# Patient Record
Sex: MALE | Race: WHITE | NOT HISPANIC OR LATINO
[De-identification: names, ages, dates, MRNs, and addresses within clinical notes are randomized per-mention and may not be internally consistent; named-entity substitution may affect disease eponyms.]

---

## 2017-08-05 ENCOUNTER — TRANSCRIPTION ENCOUNTER (OUTPATIENT)
Age: 47
End: 2017-08-05

## 2018-06-05 ENCOUNTER — EMERGENCY (EMERGENCY)
Facility: HOSPITAL | Age: 48
LOS: 1 days | Discharge: HOME | End: 2018-06-05
Admitting: EMERGENCY MEDICINE
Payer: COMMERCIAL

## 2018-06-05 VITALS
RESPIRATION RATE: 18 BRPM | DIASTOLIC BLOOD PRESSURE: 88 MMHG | TEMPERATURE: 98 F | SYSTOLIC BLOOD PRESSURE: 149 MMHG | OXYGEN SATURATION: 96 % | HEART RATE: 88 BPM

## 2018-06-05 DIAGNOSIS — R69 ILLNESS, UNSPECIFIED: ICD-10-CM

## 2018-06-05 DIAGNOSIS — F31.4 BIPOLAR DISORDER, CURRENT EPISODE DEPRESSED, SEVERE, WITHOUT PSYCHOTIC FEATURES: ICD-10-CM

## 2018-06-05 LAB
ALBUMIN SERPL ELPH-MCNC: 4.4 G/DL — SIGNIFICANT CHANGE UP (ref 3.3–5)
ALP SERPL-CCNC: 60 U/L — SIGNIFICANT CHANGE UP (ref 40–120)
ALT FLD-CCNC: 18 U/L — SIGNIFICANT CHANGE UP (ref 4–41)
AMPHET UR-MCNC: NEGATIVE — SIGNIFICANT CHANGE UP
APAP SERPL-MCNC: < 15 UG/ML — LOW (ref 15–25)
APPEARANCE UR: CLEAR — SIGNIFICANT CHANGE UP
AST SERPL-CCNC: 14 U/L — SIGNIFICANT CHANGE UP (ref 4–40)
BACTERIA # UR AUTO: SIGNIFICANT CHANGE UP
BARBITURATES UR SCN-MCNC: NEGATIVE — SIGNIFICANT CHANGE UP
BASOPHILS # BLD AUTO: 0.05 K/UL — SIGNIFICANT CHANGE UP (ref 0–0.2)
BASOPHILS NFR BLD AUTO: 0.6 % — SIGNIFICANT CHANGE UP (ref 0–2)
BENZODIAZ UR-MCNC: NEGATIVE — SIGNIFICANT CHANGE UP
BILIRUB SERPL-MCNC: 0.5 MG/DL — SIGNIFICANT CHANGE UP (ref 0.2–1.2)
BILIRUB UR-MCNC: NEGATIVE — SIGNIFICANT CHANGE UP
BLOOD UR QL VISUAL: NEGATIVE — SIGNIFICANT CHANGE UP
BUN SERPL-MCNC: 17 MG/DL — SIGNIFICANT CHANGE UP (ref 7–23)
CALCIUM SERPL-MCNC: 9.4 MG/DL — SIGNIFICANT CHANGE UP (ref 8.4–10.5)
CANNABINOIDS UR-MCNC: NEGATIVE — SIGNIFICANT CHANGE UP
CHLORIDE SERPL-SCNC: 102 MMOL/L — SIGNIFICANT CHANGE UP (ref 98–107)
CO2 SERPL-SCNC: 25 MMOL/L — SIGNIFICANT CHANGE UP (ref 22–31)
COCAINE METAB.OTHER UR-MCNC: NEGATIVE — SIGNIFICANT CHANGE UP
COLOR SPEC: YELLOW — SIGNIFICANT CHANGE UP
CREAT SERPL-MCNC: 1 MG/DL — SIGNIFICANT CHANGE UP (ref 0.5–1.3)
EOSINOPHIL # BLD AUTO: 0.06 K/UL — SIGNIFICANT CHANGE UP (ref 0–0.5)
EOSINOPHIL NFR BLD AUTO: 0.7 % — SIGNIFICANT CHANGE UP (ref 0–6)
ETHANOL BLD-MCNC: < 10 MG/DL — SIGNIFICANT CHANGE UP
GLUCOSE SERPL-MCNC: 100 MG/DL — HIGH (ref 70–99)
GLUCOSE UR-MCNC: NEGATIVE — SIGNIFICANT CHANGE UP
HCT VFR BLD CALC: 49.5 % — SIGNIFICANT CHANGE UP (ref 39–50)
HGB BLD-MCNC: 16.4 G/DL — SIGNIFICANT CHANGE UP (ref 13–17)
IMM GRANULOCYTES # BLD AUTO: 0.03 # — SIGNIFICANT CHANGE UP
IMM GRANULOCYTES NFR BLD AUTO: 0.3 % — SIGNIFICANT CHANGE UP (ref 0–1.5)
KETONES UR-MCNC: NEGATIVE — SIGNIFICANT CHANGE UP
LEUKOCYTE ESTERASE UR-ACNC: NEGATIVE — SIGNIFICANT CHANGE UP
LYMPHOCYTES # BLD AUTO: 1.96 K/UL — SIGNIFICANT CHANGE UP (ref 1–3.3)
LYMPHOCYTES # BLD AUTO: 22.6 % — SIGNIFICANT CHANGE UP (ref 13–44)
MCHC RBC-ENTMCNC: 29.3 PG — SIGNIFICANT CHANGE UP (ref 27–34)
MCHC RBC-ENTMCNC: 33.1 % — SIGNIFICANT CHANGE UP (ref 32–36)
MCV RBC AUTO: 88.6 FL — SIGNIFICANT CHANGE UP (ref 80–100)
METHADONE UR-MCNC: NEGATIVE — SIGNIFICANT CHANGE UP
MONOCYTES # BLD AUTO: 0.82 K/UL — SIGNIFICANT CHANGE UP (ref 0–0.9)
MONOCYTES NFR BLD AUTO: 9.4 % — SIGNIFICANT CHANGE UP (ref 2–14)
MUCOUS THREADS # UR AUTO: SIGNIFICANT CHANGE UP
NEUTROPHILS # BLD AUTO: 5.77 K/UL — SIGNIFICANT CHANGE UP (ref 1.8–7.4)
NEUTROPHILS NFR BLD AUTO: 66.4 % — SIGNIFICANT CHANGE UP (ref 43–77)
NITRITE UR-MCNC: NEGATIVE — SIGNIFICANT CHANGE UP
NRBC # FLD: 0 — SIGNIFICANT CHANGE UP
OPIATES UR-MCNC: NEGATIVE — SIGNIFICANT CHANGE UP
OXYCODONE UR-MCNC: NEGATIVE — SIGNIFICANT CHANGE UP
PCP UR-MCNC: NEGATIVE — SIGNIFICANT CHANGE UP
PH UR: 6 — SIGNIFICANT CHANGE UP (ref 4.6–8)
PLATELET # BLD AUTO: 298 K/UL — SIGNIFICANT CHANGE UP (ref 150–400)
PMV BLD: 10.3 FL — SIGNIFICANT CHANGE UP (ref 7–13)
POTASSIUM SERPL-MCNC: 4.1 MMOL/L — SIGNIFICANT CHANGE UP (ref 3.5–5.3)
POTASSIUM SERPL-SCNC: 4.1 MMOL/L — SIGNIFICANT CHANGE UP (ref 3.5–5.3)
PROT SERPL-MCNC: 7.3 G/DL — SIGNIFICANT CHANGE UP (ref 6–8.3)
PROT UR-MCNC: 20 MG/DL — SIGNIFICANT CHANGE UP
RBC # BLD: 5.59 M/UL — SIGNIFICANT CHANGE UP (ref 4.2–5.8)
RBC # FLD: 13.3 % — SIGNIFICANT CHANGE UP (ref 10.3–14.5)
RBC CASTS # UR COMP ASSIST: SIGNIFICANT CHANGE UP (ref 0–?)
SALICYLATES SERPL-MCNC: < 5 MG/DL — LOW (ref 15–30)
SODIUM SERPL-SCNC: 140 MMOL/L — SIGNIFICANT CHANGE UP (ref 135–145)
SP GR SPEC: 1.02 — SIGNIFICANT CHANGE UP (ref 1–1.04)
SQUAMOUS # UR AUTO: SIGNIFICANT CHANGE UP
TSH SERPL-MCNC: 1.63 UIU/ML — SIGNIFICANT CHANGE UP (ref 0.27–4.2)
UROBILINOGEN FLD QL: NORMAL MG/DL — SIGNIFICANT CHANGE UP
WBC # BLD: 8.69 K/UL — SIGNIFICANT CHANGE UP (ref 3.8–10.5)
WBC # FLD AUTO: 8.69 K/UL — SIGNIFICANT CHANGE UP (ref 3.8–10.5)
WBC UR QL: SIGNIFICANT CHANGE UP (ref 0–?)

## 2018-06-05 PROCEDURE — 99285 EMERGENCY DEPT VISIT HI MDM: CPT | Mod: 25

## 2018-06-05 PROCEDURE — 90792 PSYCH DIAG EVAL W/MED SRVCS: CPT

## 2018-06-05 PROCEDURE — 93010 ELECTROCARDIOGRAM REPORT: CPT | Mod: 59

## 2018-06-05 NOTE — ED PROVIDER NOTE - OBJECTIVE STATEMENT
This is a 48 year old Male This is a 48 year old Male PMHX Came in today for stephen Mejia reports he is here for ECT therayp sent in by his psycharist . Spoke with sister who reportx 2 past Manic attempts one hospitalization cureenlty seeing AAMIR Felder 6 weeks ago started on Buspar Lamictal and Ability caden going thru a divorce, lost children livign at home with parents not functioning. This is a 48 year old Male PMHX Came in today for psych eval Patient reports he is here for ECT therapy sent in by his psychiatrist . Spoke with sister who reports 2 past Manic attempts one hospitalization currently seeing Dr. Praveen Felder 6 weeks ago started on Buspar Lamictal and Ability currently going thru a divorce, lost children living at home with parents not functioning. Denies SI/HI Denies AH/VH Denies ETOH/Illicit drugs
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
## 2018-06-05 NOTE — ED ADULT NURSE NOTE - OBJECTIVE STATEMENT
pt A&Ox3 sent in by MD for further evaluation and possible ECT treatment, pt denies si/hi cp, pt states he does not want to be here and feels fine, he is looking for outpatient services, safety maintained will monitor,

## 2018-06-05 NOTE — ED PROVIDER NOTE - MEDICAL DECISION MAKING DETAILS
This is a 48 year old Male PMHX Came in today for psych eval Patient reports he is here for ECT therapy sent in by his psychiatrist . Spoke with sister who reports 2 past Manic attempts one hospitalization currently seeing Dr. Praveen Felder 6 weeks ago started on Buspar Lamictal and Ability currently going thru a divorce, lost children living at home with parents not functioning. Medical evaluation performed. There is no clinical evidence of intoxication or any acute medical problem requiring immediate intervention. Final disposition will be determined by psychiatrist.
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
## 2018-06-05 NOTE — ED PROVIDER NOTE - CARE PLAN
Principal Discharge DX:	Bipolar affective disorder, depressed, severe  Secondary Diagnosis:	Deferred condition on axis II

## 2018-06-05 NOTE — ED BEHAVIORAL HEALTH NOTE - BEHAVIORAL HEALTH NOTE
spoke with the patient regarding the [facility] program, which he agreed to attend.  sent an email to [facility] staff requesting an intake appointment for that program. He can be called at [phone], where messages may be left. His sister, Shagufta Hollins, [phone], should also be called, since the patient's depression may prevent him from accessing messages or recalling the appointment details.

## 2018-06-05 NOTE — ED BEHAVIORAL HEALTH ASSESSMENT NOTE - DESCRIPTION
Pt is calm and cooperative. No medication given.     Vital Signs Last 24 Hrs  T(C): 36.8 (05 Jun 2018 14:06), Max: 36.8 (05 Jun 2018 14:06)  T(F): 98.3 (05 Jun 2018 14:06), Max: 98.3 (05 Jun 2018 14:06)  HR: 88 (05 Jun 2018 14:06) (88 - 88)  BP: 149/88 (05 Jun 2018 14:06) (149/88 - 149/88)  BP(mean): --  RR: 18 (05 Jun 2018 14:06) (18 - 18)  SpO2: 96% (05 Jun 2018 14:06) (96% - 96%) denies , domiciled with parents, and unemployed.

## 2018-06-05 NOTE — ED BEHAVIORAL HEALTH ASSESSMENT NOTE - RISK ASSESSMENT
Although currently depressed, Pt denies suicidal or homicidal ideations, intent or plan. Pt does have history of suicidal ideation in the past. However, he is not an imminent danger to self or others and does not meet the criteria for involuntary admission and refuses voluntary admission.

## 2018-06-05 NOTE — ED ADULT TRIAGE NOTE - CHIEF COMPLAINT QUOTE
hx of depression currently on Lamictal, Buspar, Abilify all of which was started 6 weeks ago. Patient denies SI or HI however has loss of interest in daily activities. Calm and cooperative on triage.

## 2018-06-06 ENCOUNTER — INPATIENT (INPATIENT)
Facility: HOSPITAL | Age: 48
LOS: 21 days | Discharge: HOME | End: 2018-06-28
Attending: PSYCHIATRY & NEUROLOGY | Admitting: PSYCHIATRY & NEUROLOGY
Payer: COMMERCIAL

## 2018-06-06 VITALS
TEMPERATURE: 99 F | DIASTOLIC BLOOD PRESSURE: 103 MMHG | OXYGEN SATURATION: 99 % | SYSTOLIC BLOOD PRESSURE: 141 MMHG | HEART RATE: 93 BPM | RESPIRATION RATE: 16 BRPM

## 2018-06-06 DIAGNOSIS — F33.9 MAJOR DEPRESSIVE DISORDER, RECURRENT, UNSPECIFIED: ICD-10-CM

## 2018-06-06 PROCEDURE — 99285 EMERGENCY DEPT VISIT HI MDM: CPT

## 2018-06-06 RX ORDER — HALOPERIDOL DECANOATE 100 MG/ML
5 INJECTION INTRAMUSCULAR EVERY 6 HOURS
Qty: 0 | Refills: 0 | Status: DISCONTINUED | OUTPATIENT
Start: 2018-06-06 | End: 2018-06-28

## 2018-06-06 RX ORDER — ZOLPIDEM TARTRATE 10 MG/1
5 TABLET ORAL AT BEDTIME
Qty: 0 | Refills: 0 | Status: DISCONTINUED | OUTPATIENT
Start: 2018-06-06 | End: 2018-06-07

## 2018-06-06 RX ORDER — ACETAMINOPHEN 500 MG
650 TABLET ORAL EVERY 6 HOURS
Qty: 0 | Refills: 0 | Status: DISCONTINUED | OUTPATIENT
Start: 2018-06-06 | End: 2018-06-28

## 2018-06-06 RX ORDER — LAMOTRIGINE 25 MG/1
200 TABLET, ORALLY DISINTEGRATING ORAL DAILY
Qty: 0 | Refills: 0 | Status: DISCONTINUED | OUTPATIENT
Start: 2018-06-06 | End: 2018-06-13

## 2018-06-06 RX ORDER — ARIPIPRAZOLE 15 MG/1
5 TABLET ORAL DAILY
Qty: 0 | Refills: 0 | Status: DISCONTINUED | OUTPATIENT
Start: 2018-06-06 | End: 2018-06-08

## 2018-06-06 RX ORDER — CLONAZEPAM 1 MG
0.5 TABLET ORAL DAILY
Qty: 0 | Refills: 0 | Status: DISCONTINUED | OUTPATIENT
Start: 2018-06-06 | End: 2018-06-07

## 2018-06-06 RX ORDER — DIPHENHYDRAMINE HCL 50 MG
25 CAPSULE ORAL EVERY 6 HOURS
Qty: 0 | Refills: 0 | Status: DISCONTINUED | OUTPATIENT
Start: 2018-06-06 | End: 2018-06-28

## 2018-06-06 RX ORDER — VENLAFAXINE HCL 75 MG
37.5 CAPSULE, EXT RELEASE 24 HR ORAL DAILY
Qty: 0 | Refills: 0 | Status: DISCONTINUED | OUTPATIENT
Start: 2018-06-06 | End: 2018-06-07

## 2018-06-06 RX ADMIN — Medication 30 MILLIGRAM(S): at 20:37

## 2018-06-06 NOTE — ED PROVIDER NOTE - OBJECTIVE STATEMENT
This is a 48 year old Male   came in today for evaluation  r/t voluntary This is a 48 year old Male PMHx Bipolar came in today for voluntary admission to [facility].  Patient was seen yesterday for ECT treatment evaluated and discharged. evation evaluation  r/t voluntary    This is a 48 year old Male PMHX Came in today for psych eval Patient reports he is here for ECT therapy sent in by his psychiatrist . Spoke with sister who reports 2 past Manic attempts one hospitalization currently seeing Dr. Praveen Felder 6 weeks ago started on Buspar Lamictal and Ability currently going thru a divorce, lost children living at home with parents not functioning. Denies SI/HI Denies AH/VH Denies ETOH/Illicit drugs This is a 48 year old Male PMHx Bipolar came in today for voluntary admission to [facility].  Patient was seen yesterday for ECT treatment evaluated and discharged.   Patient reports he changed his mind and wants voluntary admission to [facility] for medication changes and ECT therapy. Denies SI/HI Denies AH/VH Denies ETOH/Illicit drugs
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
## 2018-06-06 NOTE — ED ADULT NURSE NOTE - OBJECTIVE STATEMENT
Received pt in  pt verbalizing he was sent for ECT treatments pt calm & cooperative denies Si/Hi/AVh at present eval on going.

## 2018-06-06 NOTE — ED BEHAVIORAL HEALTH ASSESSMENT NOTE - AXIS IV
Occupational problems Economic problems/Problems with interaction with legal system/Problems with primary support/Occupational problems

## 2018-06-06 NOTE — ED ADULT NURSE NOTE - CHIEF COMPLAINT QUOTE
pt was seen in  yesterday and WA'ed with a plan to follow up with out pt ect treatment. pt states that he would like in patient ect treatment instead, is here for admission. no si/hi, no auditory hallucination or visual hallucination, pt calm and cooperative in triage, no drugs or alcohol. pmhx: bipolar 1
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
## 2018-06-06 NOTE — ED BEHAVIORAL HEALTH ASSESSMENT NOTE - CURRENT MEDICATION
Lamictal, Buspar, Abilify Lamictal 200mg PO qd, Buspar 30mg po bid, Abilify 2mg po qd; Klonopin 0.5mg PO qd; Effexor 75mg PO qd; Anastrozole 1mg po qd

## 2018-06-06 NOTE — ED BEHAVIORAL HEALTH ASSESSMENT NOTE - SUICIDE PROTECTIVE FACTORS
Responsibility to family and others/Supportive social network or family Responsibility to family and others/Positive therapeutic relationships/Supportive social network or family
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
## 2018-06-06 NOTE — ED ADULT TRIAGE NOTE - CHIEF COMPLAINT QUOTE
pt was seen in  yesterday and NH'ed with a plan to follow up with out pt ect treatment. pt states that he would like in patient ect treatment instead, is here for admission. no si/hi, no auditory hallucination or visual hallucination, pt calm and cooperative in triage, no drugs or alcohol. pmhx: bipolar 1
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
## 2018-06-06 NOTE — ED BEHAVIORAL HEALTH ASSESSMENT NOTE - DESCRIPTION
Pt is calm and cooperative. No medication given.     Vital Signs Last 24 Hrs  T(C): 36.8 (05 Jun 2018 14:06), Max: 36.8 (05 Jun 2018 14:06)  T(F): 98.3 (05 Jun 2018 14:06), Max: 98.3 (05 Jun 2018 14:06)  HR: 88 (05 Jun 2018 14:06) (88 - 88)  BP: 149/88 (05 Jun 2018 14:06) (149/88 - 149/88)  BP(mean): --  RR: 18 (05 Jun 2018 14:06) (18 - 18)  SpO2: 96% (05 Jun 2018 14:06) (96% - 96%) denies , domiciled with parents, and unemployed. Pt is calm and cooperative. No medication given. 

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
## 2018-06-06 NOTE — ED BEHAVIORAL HEALTH ASSESSMENT NOTE - DETAILS
see hpi. Depakote, weight gain Dr. Felder. hx of ideation but no hx of attempts see legal hx [facility] called for hand off Dr Felder aware of admission Unit called for hand off; talked to Dr Elizabeth

## 2018-06-06 NOTE — ED BEHAVIORAL HEALTH ASSESSMENT NOTE - OTHER PAST PSYCHIATRIC HISTORY (INCLUDE DETAILS REGARDING ONSET, COURSE OF ILLNESS, INPATIENT/OUTPATIENT TREATMENT)
bipolar disorder - hx of manic episode ("spending things. Not racing thoughts or things like that.") He was hospitalized at that time a few years back. He was doing well after discharge but in April 2017, his new psychiatrist told him that he did not have Bipolar and took him off all psychotropic medications. He was initially doing ok but then became more and more depressed and family wanted him to get a second opinion so he started seeing Dr. Praveen Felder 6 weeks ago and had sessions "a couple times." Dr. Felder put Pt on Lamictal, Buspar, and Abilify 6 weeks ago but there has not been much improvement and hence the psychiatrist suggested Pt go to an inpatient ECT consultation.  - denies hx of suicide attempts but has hx of suicidal ideation  - 1st admission for soledad 3 years ago and second inpt psych hospitalization (voluntary, 1wk-10 days) at [facility] 2 Summers ago
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
## 2018-06-06 NOTE — ED PROVIDER NOTE - MEDICAL DECISION MAKING DETAILS
This is a 48 year old Male PMHx Bipolar came in today for voluntary admission to [facility].  Patient was seen yesterday for ECT treatment evaluated and discharged.   Patient reports he changed his mind and wants voluntary admission to [facility] for medication changes and ECT therapy. Medical evaluation performed. There is no clinical evidence of intoxication or any acute medical problem requiring immediate intervention. Final disposition will be determined by psychiatrist.

## 2018-06-07 PROCEDURE — 99222 1ST HOSP IP/OBS MODERATE 55: CPT | Mod: 25,GC

## 2018-06-07 PROCEDURE — 90853 GROUP PSYCHOTHERAPY: CPT

## 2018-06-07 RX ORDER — VENLAFAXINE HCL 75 MG
37.5 CAPSULE, EXT RELEASE 24 HR ORAL DAILY
Qty: 0 | Refills: 0 | Status: DISCONTINUED | OUTPATIENT
Start: 2018-06-07 | End: 2018-06-07

## 2018-06-07 RX ORDER — CLONAZEPAM 1 MG
0.5 TABLET ORAL DAILY
Qty: 0 | Refills: 0 | Status: DISCONTINUED | OUTPATIENT
Start: 2018-06-07 | End: 2018-06-11

## 2018-06-07 RX ADMIN — ARIPIPRAZOLE 5 MILLIGRAM(S): 15 TABLET ORAL at 08:33

## 2018-06-07 RX ADMIN — Medication 30 MILLIGRAM(S): at 08:33

## 2018-06-07 RX ADMIN — LAMOTRIGINE 200 MILLIGRAM(S): 25 TABLET, ORALLY DISINTEGRATING ORAL at 08:33

## 2018-06-07 RX ADMIN — Medication 37.5 MILLIGRAM(S): at 09:13

## 2018-06-07 RX ADMIN — Medication 0.5 MILLIGRAM(S): at 08:33

## 2018-06-07 RX ADMIN — Medication 30 MILLIGRAM(S): at 20:24

## 2018-06-08 PROCEDURE — 90832 PSYTX W PT 30 MINUTES: CPT

## 2018-06-08 PROCEDURE — 99232 SBSQ HOSP IP/OBS MODERATE 35: CPT | Mod: GC

## 2018-06-08 RX ORDER — ARIPIPRAZOLE 15 MG/1
5 TABLET ORAL AT BEDTIME
Qty: 0 | Refills: 0 | Status: DISCONTINUED | OUTPATIENT
Start: 2018-06-09 | End: 2018-06-09

## 2018-06-08 RX ADMIN — Medication 30 MILLIGRAM(S): at 20:38

## 2018-06-08 RX ADMIN — ARIPIPRAZOLE 5 MILLIGRAM(S): 15 TABLET ORAL at 08:41

## 2018-06-08 RX ADMIN — LAMOTRIGINE 200 MILLIGRAM(S): 25 TABLET, ORALLY DISINTEGRATING ORAL at 08:41

## 2018-06-08 RX ADMIN — Medication 30 MILLIGRAM(S): at 08:41

## 2018-06-09 PROCEDURE — 90870 ELECTROCONVULSIVE THERAPY: CPT

## 2018-06-09 RX ORDER — DOCUSATE SODIUM 100 MG
100 CAPSULE ORAL DAILY
Qty: 0 | Refills: 0 | Status: DISCONTINUED | OUTPATIENT
Start: 2018-06-09 | End: 2018-06-28

## 2018-06-09 RX ORDER — ARIPIPRAZOLE 15 MG/1
7 TABLET ORAL AT BEDTIME
Qty: 0 | Refills: 0 | Status: DISCONTINUED | OUTPATIENT
Start: 2018-06-09 | End: 2018-06-12

## 2018-06-09 RX ORDER — MAGNESIUM HYDROXIDE 400 MG/1
30 TABLET, CHEWABLE ORAL ONCE
Qty: 0 | Refills: 0 | Status: COMPLETED | OUTPATIENT
Start: 2018-06-09 | End: 2018-06-09

## 2018-06-09 RX ADMIN — Medication 30 MILLIGRAM(S): at 08:46

## 2018-06-09 RX ADMIN — ARIPIPRAZOLE 7 MILLIGRAM(S): 15 TABLET ORAL at 20:29

## 2018-06-09 RX ADMIN — Medication 30 MILLIGRAM(S): at 20:29

## 2018-06-09 RX ADMIN — MAGNESIUM HYDROXIDE 30 MILLILITER(S): 400 TABLET, CHEWABLE ORAL at 20:55

## 2018-06-09 RX ADMIN — LAMOTRIGINE 200 MILLIGRAM(S): 25 TABLET, ORALLY DISINTEGRATING ORAL at 08:47

## 2018-06-10 PROCEDURE — 99232 SBSQ HOSP IP/OBS MODERATE 35: CPT

## 2018-06-10 RX ADMIN — Medication 0.5 MILLIGRAM(S): at 21:27

## 2018-06-10 RX ADMIN — LAMOTRIGINE 200 MILLIGRAM(S): 25 TABLET, ORALLY DISINTEGRATING ORAL at 08:18

## 2018-06-10 RX ADMIN — Medication 30 MILLIGRAM(S): at 21:25

## 2018-06-10 RX ADMIN — Medication 30 MILLIGRAM(S): at 08:18

## 2018-06-10 RX ADMIN — ARIPIPRAZOLE 7 MILLIGRAM(S): 15 TABLET ORAL at 21:25

## 2018-06-10 RX ADMIN — Medication 100 MILLIGRAM(S): at 09:01

## 2018-06-11 PROCEDURE — 99232 SBSQ HOSP IP/OBS MODERATE 35: CPT | Mod: 25,GC

## 2018-06-11 PROCEDURE — 90853 GROUP PSYCHOTHERAPY: CPT

## 2018-06-11 RX ORDER — CLONAZEPAM 1 MG
1 TABLET ORAL
Qty: 0 | Refills: 0 | Status: DISCONTINUED | OUTPATIENT
Start: 2018-06-11 | End: 2018-06-12

## 2018-06-11 RX ADMIN — LAMOTRIGINE 200 MILLIGRAM(S): 25 TABLET, ORALLY DISINTEGRATING ORAL at 08:52

## 2018-06-11 RX ADMIN — Medication 30 MILLIGRAM(S): at 20:47

## 2018-06-11 RX ADMIN — ARIPIPRAZOLE 7 MILLIGRAM(S): 15 TABLET ORAL at 20:47

## 2018-06-11 RX ADMIN — Medication 1 MILLIGRAM(S): at 20:47

## 2018-06-11 RX ADMIN — Medication 30 MILLIGRAM(S): at 08:52

## 2018-06-11 RX ADMIN — Medication 2 MILLIGRAM(S): at 14:56

## 2018-06-11 RX ADMIN — Medication 100 MILLIGRAM(S): at 08:49

## 2018-06-12 PROCEDURE — 90853 GROUP PSYCHOTHERAPY: CPT

## 2018-06-12 PROCEDURE — 93010 ELECTROCARDIOGRAM REPORT: CPT

## 2018-06-12 PROCEDURE — 99232 SBSQ HOSP IP/OBS MODERATE 35: CPT | Mod: 25,GC

## 2018-06-12 RX ORDER — CLONAZEPAM 1 MG
1 TABLET ORAL DAILY
Qty: 0 | Refills: 0 | Status: DISCONTINUED | OUTPATIENT
Start: 2018-06-12 | End: 2018-06-17

## 2018-06-12 RX ORDER — ARIPIPRAZOLE 15 MG/1
5 TABLET ORAL DAILY
Qty: 0 | Refills: 0 | Status: DISCONTINUED | OUTPATIENT
Start: 2018-06-12 | End: 2018-06-18

## 2018-06-12 RX ORDER — POLYETHYLENE GLYCOL 3350 17 G/17G
17 POWDER, FOR SOLUTION ORAL DAILY
Qty: 0 | Refills: 0 | Status: DISCONTINUED | OUTPATIENT
Start: 2018-06-12 | End: 2018-06-28

## 2018-06-12 RX ORDER — DOCUSATE SODIUM 100 MG
100 CAPSULE ORAL DAILY
Qty: 0 | Refills: 0 | Status: DISCONTINUED | OUTPATIENT
Start: 2018-06-12 | End: 2018-06-28

## 2018-06-12 RX ORDER — SENNA PLUS 8.6 MG/1
2 TABLET ORAL AT BEDTIME
Qty: 0 | Refills: 0 | Status: DISCONTINUED | OUTPATIENT
Start: 2018-06-12 | End: 2018-06-28

## 2018-06-12 RX ORDER — CLONAZEPAM 1 MG
1 TABLET ORAL AT BEDTIME
Qty: 0 | Refills: 0 | Status: DISCONTINUED | OUTPATIENT
Start: 2018-06-12 | End: 2018-06-13

## 2018-06-12 RX ADMIN — Medication 30 MILLIGRAM(S): at 08:22

## 2018-06-12 RX ADMIN — Medication 1 MILLIGRAM(S): at 21:21

## 2018-06-12 RX ADMIN — LAMOTRIGINE 200 MILLIGRAM(S): 25 TABLET, ORALLY DISINTEGRATING ORAL at 08:50

## 2018-06-12 RX ADMIN — Medication 1 MILLIGRAM(S): at 08:22

## 2018-06-12 RX ADMIN — SENNA PLUS 2 TABLET(S): 8.6 TABLET ORAL at 21:21

## 2018-06-12 RX ADMIN — Medication 2 MILLIGRAM(S): at 21:10

## 2018-06-12 RX ADMIN — Medication 30 MILLIGRAM(S): at 21:21

## 2018-06-13 LAB
ALBUMIN SERPL ELPH-MCNC: 4.2 G/DL — SIGNIFICANT CHANGE UP (ref 3.3–5)
ALP SERPL-CCNC: 60 U/L — SIGNIFICANT CHANGE UP (ref 40–120)
ALT FLD-CCNC: 20 U/L — SIGNIFICANT CHANGE UP (ref 4–41)
AST SERPL-CCNC: 16 U/L — SIGNIFICANT CHANGE UP (ref 4–40)
BILIRUB SERPL-MCNC: 0.4 MG/DL — SIGNIFICANT CHANGE UP (ref 0.2–1.2)
BUN SERPL-MCNC: 14 MG/DL — SIGNIFICANT CHANGE UP (ref 7–23)
CALCIUM SERPL-MCNC: 9.4 MG/DL — SIGNIFICANT CHANGE UP (ref 8.4–10.5)
CHLORIDE SERPL-SCNC: 100 MMOL/L — SIGNIFICANT CHANGE UP (ref 98–107)
CO2 SERPL-SCNC: 25 MMOL/L — SIGNIFICANT CHANGE UP (ref 22–31)
CREAT SERPL-MCNC: 1.13 MG/DL — SIGNIFICANT CHANGE UP (ref 0.5–1.3)
GLUCOSE SERPL-MCNC: 83 MG/DL — SIGNIFICANT CHANGE UP (ref 70–99)
HCT VFR BLD CALC: 50.8 % — HIGH (ref 39–50)
HGB BLD-MCNC: 16.8 G/DL — SIGNIFICANT CHANGE UP (ref 13–17)
MCHC RBC-ENTMCNC: 29.4 PG — SIGNIFICANT CHANGE UP (ref 27–34)
MCHC RBC-ENTMCNC: 33.1 % — SIGNIFICANT CHANGE UP (ref 32–36)
MCV RBC AUTO: 88.8 FL — SIGNIFICANT CHANGE UP (ref 80–100)
NRBC # FLD: 0 — SIGNIFICANT CHANGE UP
PLATELET # BLD AUTO: 279 K/UL — SIGNIFICANT CHANGE UP (ref 150–400)
PMV BLD: 10.6 FL — SIGNIFICANT CHANGE UP (ref 7–13)
POTASSIUM SERPL-MCNC: 4.1 MMOL/L — SIGNIFICANT CHANGE UP (ref 3.5–5.3)
POTASSIUM SERPL-SCNC: 4.1 MMOL/L — SIGNIFICANT CHANGE UP (ref 3.5–5.3)
PROT SERPL-MCNC: 7 G/DL — SIGNIFICANT CHANGE UP (ref 6–8.3)
RBC # BLD: 5.72 M/UL — SIGNIFICANT CHANGE UP (ref 4.2–5.8)
RBC # FLD: 13.7 % — SIGNIFICANT CHANGE UP (ref 10.3–14.5)
SODIUM SERPL-SCNC: 138 MMOL/L — SIGNIFICANT CHANGE UP (ref 135–145)
WBC # BLD: 8.56 K/UL — SIGNIFICANT CHANGE UP (ref 3.8–10.5)
WBC # FLD AUTO: 8.56 K/UL — SIGNIFICANT CHANGE UP (ref 3.8–10.5)

## 2018-06-13 PROCEDURE — 99232 SBSQ HOSP IP/OBS MODERATE 35: CPT | Mod: 25,GC

## 2018-06-13 PROCEDURE — 90853 GROUP PSYCHOTHERAPY: CPT

## 2018-06-13 RX ORDER — CLONAZEPAM 1 MG
1 TABLET ORAL AT BEDTIME
Qty: 0 | Refills: 0 | Status: DISCONTINUED | OUTPATIENT
Start: 2018-06-13 | End: 2018-06-14

## 2018-06-13 RX ORDER — LAMOTRIGINE 25 MG/1
200 TABLET, ORALLY DISINTEGRATING ORAL DAILY
Qty: 0 | Refills: 0 | Status: DISCONTINUED | OUTPATIENT
Start: 2018-06-13 | End: 2018-06-14

## 2018-06-13 RX ADMIN — Medication 30 MILLIGRAM(S): at 08:09

## 2018-06-13 RX ADMIN — Medication 30 MILLIGRAM(S): at 20:39

## 2018-06-13 RX ADMIN — LAMOTRIGINE 200 MILLIGRAM(S): 25 TABLET, ORALLY DISINTEGRATING ORAL at 12:06

## 2018-06-13 RX ADMIN — SENNA PLUS 2 TABLET(S): 8.6 TABLET ORAL at 20:39

## 2018-06-13 RX ADMIN — Medication 1 MILLIGRAM(S): at 20:39

## 2018-06-13 RX ADMIN — ARIPIPRAZOLE 5 MILLIGRAM(S): 15 TABLET ORAL at 08:09

## 2018-06-14 PROCEDURE — 99232 SBSQ HOSP IP/OBS MODERATE 35: CPT | Mod: 25,GC

## 2018-06-14 PROCEDURE — 90853 GROUP PSYCHOTHERAPY: CPT

## 2018-06-14 RX ORDER — CLONAZEPAM 1 MG
0.5 TABLET ORAL AT BEDTIME
Qty: 0 | Refills: 0 | Status: DISCONTINUED | OUTPATIENT
Start: 2018-06-14 | End: 2018-06-20

## 2018-06-14 RX ADMIN — Medication 30 MILLIGRAM(S): at 20:36

## 2018-06-14 RX ADMIN — Medication 30 MILLILITER(S): at 21:07

## 2018-06-14 RX ADMIN — LAMOTRIGINE 200 MILLIGRAM(S): 25 TABLET, ORALLY DISINTEGRATING ORAL at 08:41

## 2018-06-14 RX ADMIN — ARIPIPRAZOLE 5 MILLIGRAM(S): 15 TABLET ORAL at 08:41

## 2018-06-14 RX ADMIN — Medication 30 MILLIGRAM(S): at 08:41

## 2018-06-14 RX ADMIN — Medication 0.5 MILLIGRAM(S): at 20:36

## 2018-06-14 RX ADMIN — SENNA PLUS 2 TABLET(S): 8.6 TABLET ORAL at 20:36

## 2018-06-15 PROCEDURE — 90870 ELECTROCONVULSIVE THERAPY: CPT

## 2018-06-15 PROCEDURE — 90853 GROUP PSYCHOTHERAPY: CPT

## 2018-06-15 PROCEDURE — 99232 SBSQ HOSP IP/OBS MODERATE 35: CPT | Mod: 25,GC

## 2018-06-15 RX ORDER — LAMOTRIGINE 25 MG/1
200 TABLET, ORALLY DISINTEGRATING ORAL DAILY
Qty: 0 | Refills: 0 | Status: COMPLETED | OUTPATIENT
Start: 2018-06-16 | End: 2018-06-17

## 2018-06-15 RX ORDER — LAMOTRIGINE 25 MG/1
200 TABLET, ORALLY DISINTEGRATING ORAL ONCE
Qty: 0 | Refills: 0 | Status: COMPLETED | OUTPATIENT
Start: 2018-06-15 | End: 2018-06-15

## 2018-06-15 RX ADMIN — Medication 650 MILLIGRAM(S): at 14:34

## 2018-06-15 RX ADMIN — Medication 30 MILLIGRAM(S): at 21:29

## 2018-06-15 RX ADMIN — Medication 0.5 MILLIGRAM(S): at 21:29

## 2018-06-15 RX ADMIN — SENNA PLUS 2 TABLET(S): 8.6 TABLET ORAL at 21:29

## 2018-06-15 RX ADMIN — Medication 30 MILLIGRAM(S): at 09:27

## 2018-06-15 RX ADMIN — ARIPIPRAZOLE 5 MILLIGRAM(S): 15 TABLET ORAL at 09:27

## 2018-06-15 RX ADMIN — Medication 2 MILLIGRAM(S): at 17:25

## 2018-06-15 RX ADMIN — LAMOTRIGINE 200 MILLIGRAM(S): 25 TABLET, ORALLY DISINTEGRATING ORAL at 14:28

## 2018-06-16 RX ADMIN — Medication 30 MILLIGRAM(S): at 09:47

## 2018-06-16 RX ADMIN — ARIPIPRAZOLE 5 MILLIGRAM(S): 15 TABLET ORAL at 09:47

## 2018-06-16 RX ADMIN — Medication 0.5 MILLIGRAM(S): at 21:21

## 2018-06-16 RX ADMIN — SENNA PLUS 2 TABLET(S): 8.6 TABLET ORAL at 21:21

## 2018-06-16 RX ADMIN — LAMOTRIGINE 200 MILLIGRAM(S): 25 TABLET, ORALLY DISINTEGRATING ORAL at 09:47

## 2018-06-16 RX ADMIN — Medication 1 MILLIGRAM(S): at 15:02

## 2018-06-16 RX ADMIN — Medication 30 MILLIGRAM(S): at 21:21

## 2018-06-17 RX ADMIN — Medication 30 MILLIGRAM(S): at 21:13

## 2018-06-17 RX ADMIN — Medication 1 MILLIGRAM(S): at 15:03

## 2018-06-17 RX ADMIN — ARIPIPRAZOLE 5 MILLIGRAM(S): 15 TABLET ORAL at 09:55

## 2018-06-17 RX ADMIN — Medication 0.5 MILLIGRAM(S): at 21:13

## 2018-06-17 RX ADMIN — Medication 30 MILLIGRAM(S): at 09:56

## 2018-06-17 RX ADMIN — SENNA PLUS 2 TABLET(S): 8.6 TABLET ORAL at 21:13

## 2018-06-17 RX ADMIN — LAMOTRIGINE 200 MILLIGRAM(S): 25 TABLET, ORALLY DISINTEGRATING ORAL at 09:56

## 2018-06-18 PROCEDURE — 99232 SBSQ HOSP IP/OBS MODERATE 35: CPT | Mod: 25,GC

## 2018-06-18 PROCEDURE — 90853 GROUP PSYCHOTHERAPY: CPT

## 2018-06-18 PROCEDURE — 90870 ELECTROCONVULSIVE THERAPY: CPT

## 2018-06-18 RX ORDER — LAMOTRIGINE 25 MG/1
200 TABLET, ORALLY DISINTEGRATING ORAL ONCE
Qty: 0 | Refills: 0 | Status: COMPLETED | OUTPATIENT
Start: 2018-06-18 | End: 2018-06-18

## 2018-06-18 RX ORDER — LAMOTRIGINE 25 MG/1
200 TABLET, ORALLY DISINTEGRATING ORAL DAILY
Qty: 0 | Refills: 0 | Status: DISCONTINUED | OUTPATIENT
Start: 2018-06-19 | End: 2018-06-20

## 2018-06-18 RX ADMIN — LAMOTRIGINE 200 MILLIGRAM(S): 25 TABLET, ORALLY DISINTEGRATING ORAL at 18:47

## 2018-06-18 RX ADMIN — Medication 0.5 MILLIGRAM(S): at 21:19

## 2018-06-18 RX ADMIN — SENNA PLUS 2 TABLET(S): 8.6 TABLET ORAL at 21:19

## 2018-06-18 RX ADMIN — Medication 30 MILLIGRAM(S): at 09:55

## 2018-06-18 RX ADMIN — Medication 30 MILLIGRAM(S): at 21:19

## 2018-06-19 PROCEDURE — 90853 GROUP PSYCHOTHERAPY: CPT

## 2018-06-19 PROCEDURE — 99232 SBSQ HOSP IP/OBS MODERATE 35: CPT | Mod: 25,GC

## 2018-06-19 RX ADMIN — SENNA PLUS 2 TABLET(S): 8.6 TABLET ORAL at 21:32

## 2018-06-19 RX ADMIN — LAMOTRIGINE 200 MILLIGRAM(S): 25 TABLET, ORALLY DISINTEGRATING ORAL at 09:11

## 2018-06-19 RX ADMIN — Medication 30 MILLIGRAM(S): at 09:11

## 2018-06-19 RX ADMIN — Medication 0.5 MILLIGRAM(S): at 21:32

## 2018-06-19 RX ADMIN — Medication 30 MILLIGRAM(S): at 21:32

## 2018-06-20 PROCEDURE — 99232 SBSQ HOSP IP/OBS MODERATE 35: CPT | Mod: 25,GC

## 2018-06-20 PROCEDURE — 90870 ELECTROCONVULSIVE THERAPY: CPT

## 2018-06-20 RX ORDER — LAMOTRIGINE 25 MG/1
200 TABLET, ORALLY DISINTEGRATING ORAL DAILY
Qty: 0 | Refills: 0 | Status: DISCONTINUED | OUTPATIENT
Start: 2018-06-20 | End: 2018-06-22

## 2018-06-20 RX ORDER — CLONAZEPAM 1 MG
0.5 TABLET ORAL AT BEDTIME
Qty: 0 | Refills: 0 | Status: DISCONTINUED | OUTPATIENT
Start: 2018-06-20 | End: 2018-06-21

## 2018-06-20 RX ADMIN — LAMOTRIGINE 200 MILLIGRAM(S): 25 TABLET, ORALLY DISINTEGRATING ORAL at 12:04

## 2018-06-20 RX ADMIN — SENNA PLUS 2 TABLET(S): 8.6 TABLET ORAL at 20:32

## 2018-06-20 RX ADMIN — Medication 2 MILLIGRAM(S): at 18:16

## 2018-06-20 RX ADMIN — Medication 30 MILLIGRAM(S): at 20:32

## 2018-06-20 RX ADMIN — Medication 30 MILLIGRAM(S): at 08:36

## 2018-06-21 PROCEDURE — 99232 SBSQ HOSP IP/OBS MODERATE 35: CPT | Mod: 25,GC

## 2018-06-21 PROCEDURE — 90853 GROUP PSYCHOTHERAPY: CPT

## 2018-06-21 RX ORDER — CLONAZEPAM 1 MG
0.5 TABLET ORAL AT BEDTIME
Qty: 0 | Refills: 0 | Status: DISCONTINUED | OUTPATIENT
Start: 2018-06-21 | End: 2018-06-28

## 2018-06-21 RX ADMIN — Medication 30 MILLIGRAM(S): at 20:43

## 2018-06-21 RX ADMIN — Medication 30 MILLIGRAM(S): at 08:31

## 2018-06-21 RX ADMIN — LAMOTRIGINE 200 MILLIGRAM(S): 25 TABLET, ORALLY DISINTEGRATING ORAL at 08:31

## 2018-06-21 RX ADMIN — SENNA PLUS 2 TABLET(S): 8.6 TABLET ORAL at 20:43

## 2018-06-21 RX ADMIN — Medication 2 MILLIGRAM(S): at 17:25

## 2018-06-22 PROCEDURE — 99232 SBSQ HOSP IP/OBS MODERATE 35: CPT | Mod: 25,GC

## 2018-06-22 PROCEDURE — 90870 ELECTROCONVULSIVE THERAPY: CPT

## 2018-06-22 RX ORDER — LAMOTRIGINE 25 MG/1
200 TABLET, ORALLY DISINTEGRATING ORAL
Qty: 0 | Refills: 0 | Status: COMPLETED | OUTPATIENT
Start: 2018-06-22 | End: 2018-06-22

## 2018-06-22 RX ORDER — LAMOTRIGINE 25 MG/1
200 TABLET, ORALLY DISINTEGRATING ORAL DAILY
Qty: 0 | Refills: 0 | Status: DISCONTINUED | OUTPATIENT
Start: 2018-06-23 | End: 2018-06-25

## 2018-06-22 RX ADMIN — SENNA PLUS 2 TABLET(S): 8.6 TABLET ORAL at 21:41

## 2018-06-22 RX ADMIN — Medication 30 MILLIGRAM(S): at 09:00

## 2018-06-22 RX ADMIN — Medication 30 MILLIGRAM(S): at 21:41

## 2018-06-22 RX ADMIN — Medication 1 MILLIGRAM(S): at 16:48

## 2018-06-22 RX ADMIN — LAMOTRIGINE 200 MILLIGRAM(S): 25 TABLET, ORALLY DISINTEGRATING ORAL at 16:48

## 2018-06-23 RX ADMIN — LAMOTRIGINE 200 MILLIGRAM(S): 25 TABLET, ORALLY DISINTEGRATING ORAL at 09:33

## 2018-06-23 RX ADMIN — Medication 30 MILLIGRAM(S): at 09:33

## 2018-06-23 RX ADMIN — Medication 1 MILLIGRAM(S): at 16:27

## 2018-06-23 RX ADMIN — Medication 30 MILLIGRAM(S): at 20:55

## 2018-06-23 RX ADMIN — SENNA PLUS 2 TABLET(S): 8.6 TABLET ORAL at 20:54

## 2018-06-24 RX ADMIN — Medication 30 MILLIGRAM(S): at 08:39

## 2018-06-24 RX ADMIN — Medication 1 MILLIGRAM(S): at 13:03

## 2018-06-24 RX ADMIN — LAMOTRIGINE 200 MILLIGRAM(S): 25 TABLET, ORALLY DISINTEGRATING ORAL at 08:39

## 2018-06-24 RX ADMIN — SENNA PLUS 2 TABLET(S): 8.6 TABLET ORAL at 20:44

## 2018-06-24 RX ADMIN — Medication 30 MILLIGRAM(S): at 20:44

## 2018-06-25 PROCEDURE — 99232 SBSQ HOSP IP/OBS MODERATE 35: CPT | Mod: 25,GC

## 2018-06-25 PROCEDURE — 90870 ELECTROCONVULSIVE THERAPY: CPT

## 2018-06-25 PROCEDURE — 90832 PSYTX W PT 30 MINUTES: CPT | Mod: 59

## 2018-06-25 PROCEDURE — 90853 GROUP PSYCHOTHERAPY: CPT

## 2018-06-25 RX ORDER — LAMOTRIGINE 25 MG/1
200 TABLET, ORALLY DISINTEGRATING ORAL ONCE
Qty: 0 | Refills: 0 | Status: COMPLETED | OUTPATIENT
Start: 2018-06-25 | End: 2018-06-25

## 2018-06-25 RX ORDER — LAMOTRIGINE 25 MG/1
200 TABLET, ORALLY DISINTEGRATING ORAL DAILY
Qty: 0 | Refills: 0 | Status: DISCONTINUED | OUTPATIENT
Start: 2018-06-26 | End: 2018-06-27

## 2018-06-25 RX ADMIN — LAMOTRIGINE 200 MILLIGRAM(S): 25 TABLET, ORALLY DISINTEGRATING ORAL at 16:58

## 2018-06-25 RX ADMIN — SENNA PLUS 2 TABLET(S): 8.6 TABLET ORAL at 20:55

## 2018-06-25 RX ADMIN — Medication 30 MILLIGRAM(S): at 20:55

## 2018-06-25 RX ADMIN — Medication 0.5 MILLIGRAM(S): at 20:55

## 2018-06-25 RX ADMIN — Medication 1 MILLIGRAM(S): at 09:32

## 2018-06-25 RX ADMIN — Medication 30 MILLIGRAM(S): at 08:43

## 2018-06-26 PROCEDURE — 90853 GROUP PSYCHOTHERAPY: CPT

## 2018-06-26 PROCEDURE — 99232 SBSQ HOSP IP/OBS MODERATE 35: CPT | Mod: 25,GC

## 2018-06-26 RX ORDER — CLONAZEPAM 1 MG
1 TABLET ORAL
Qty: 14 | Refills: 0 | OUTPATIENT
Start: 2018-06-26 | End: 2018-07-09

## 2018-06-26 RX ORDER — CLONAZEPAM 1 MG
1 TABLET ORAL
Qty: 0 | Refills: 0 | COMMUNITY

## 2018-06-26 RX ORDER — LAMOTRIGINE 25 MG/1
1 TABLET, ORALLY DISINTEGRATING ORAL
Qty: 14 | Refills: 0 | OUTPATIENT
Start: 2018-06-26 | End: 2018-07-09

## 2018-06-26 RX ORDER — VENLAFAXINE HCL 75 MG
1 CAPSULE, EXT RELEASE 24 HR ORAL
Qty: 0 | Refills: 0 | COMMUNITY

## 2018-06-26 RX ORDER — LAMOTRIGINE 25 MG/1
1 TABLET, ORALLY DISINTEGRATING ORAL
Qty: 0 | Refills: 0 | COMMUNITY

## 2018-06-26 RX ORDER — ARIPIPRAZOLE 15 MG/1
1 TABLET ORAL
Qty: 0 | Refills: 0 | COMMUNITY

## 2018-06-26 RX ADMIN — Medication 30 MILLIGRAM(S): at 21:23

## 2018-06-26 RX ADMIN — LAMOTRIGINE 200 MILLIGRAM(S): 25 TABLET, ORALLY DISINTEGRATING ORAL at 08:38

## 2018-06-26 RX ADMIN — Medication 1 MILLIGRAM(S): at 13:12

## 2018-06-26 RX ADMIN — Medication 30 MILLIGRAM(S): at 08:38

## 2018-06-26 RX ADMIN — SENNA PLUS 2 TABLET(S): 8.6 TABLET ORAL at 21:23

## 2018-06-27 PROCEDURE — 90853 GROUP PSYCHOTHERAPY: CPT

## 2018-06-27 PROCEDURE — 90870 ELECTROCONVULSIVE THERAPY: CPT

## 2018-06-27 PROCEDURE — 99232 SBSQ HOSP IP/OBS MODERATE 35: CPT | Mod: 25,GC

## 2018-06-27 RX ORDER — LAMOTRIGINE 25 MG/1
200 TABLET, ORALLY DISINTEGRATING ORAL ONCE
Qty: 0 | Refills: 0 | Status: COMPLETED | OUTPATIENT
Start: 2018-06-27 | End: 2018-06-27

## 2018-06-27 RX ADMIN — Medication 30 MILLIGRAM(S): at 09:04

## 2018-06-27 RX ADMIN — LAMOTRIGINE 200 MILLIGRAM(S): 25 TABLET, ORALLY DISINTEGRATING ORAL at 22:25

## 2018-06-27 RX ADMIN — Medication 30 MILLILITER(S): at 22:18

## 2018-06-27 RX ADMIN — Medication 30 MILLIGRAM(S): at 21:30

## 2018-06-27 RX ADMIN — SENNA PLUS 2 TABLET(S): 8.6 TABLET ORAL at 21:30

## 2018-06-28 VITALS — DIASTOLIC BLOOD PRESSURE: 89 MMHG | HEART RATE: 90 BPM | TEMPERATURE: 98 F | SYSTOLIC BLOOD PRESSURE: 121 MMHG

## 2018-06-28 PROCEDURE — 99233 SBSQ HOSP IP/OBS HIGH 50: CPT | Mod: 25,GC

## 2018-06-28 PROCEDURE — 90832 PSYTX W PT 30 MINUTES: CPT | Mod: 59

## 2018-06-28 PROCEDURE — 90853 GROUP PSYCHOTHERAPY: CPT

## 2018-06-28 RX ORDER — LAMOTRIGINE 25 MG/1
200 TABLET, ORALLY DISINTEGRATING ORAL DAILY
Qty: 0 | Refills: 0 | Status: DISCONTINUED | OUTPATIENT
Start: 2018-06-28 | End: 2018-06-28

## 2018-06-28 RX ADMIN — Medication 30 MILLIGRAM(S): at 09:05

## 2018-06-28 RX ADMIN — LAMOTRIGINE 200 MILLIGRAM(S): 25 TABLET, ORALLY DISINTEGRATING ORAL at 09:05

## 2018-06-29 ENCOUNTER — OUTPATIENT (OUTPATIENT)
Dept: OUTPATIENT SERVICES | Facility: HOSPITAL | Age: 48
LOS: 1 days | Discharge: HOME | End: 2018-06-29
Payer: COMMERCIAL

## 2018-06-29 DIAGNOSIS — F33.9 MAJOR DEPRESSIVE DISORDER, RECURRENT, UNSPECIFIED: ICD-10-CM

## 2018-07-03 DIAGNOSIS — F20.81 SCHIZOPHRENIFORM DISORDER: ICD-10-CM

## 2018-07-03 PROCEDURE — 90870 ELECTROCONVULSIVE THERAPY: CPT

## 2018-07-05 PROCEDURE — 90870 ELECTROCONVULSIVE THERAPY: CPT

## 2018-07-12 PROCEDURE — 90870 ELECTROCONVULSIVE THERAPY: CPT

## 2018-07-17 PROCEDURE — 90870 ELECTROCONVULSIVE THERAPY: CPT

## 2018-07-17 RX ORDER — MIDAZOLAM HYDROCHLORIDE 1 MG/ML
2 INJECTION, SOLUTION INTRAMUSCULAR; INTRAVENOUS ONCE
Qty: 0 | Refills: 0 | Status: DISCONTINUED | OUTPATIENT
Start: 2018-07-17 | End: 2018-07-17

## 2018-07-17 RX ADMIN — MIDAZOLAM HYDROCHLORIDE 2 MILLIGRAM(S): 1 INJECTION, SOLUTION INTRAMUSCULAR; INTRAVENOUS at 15:49

## 2018-07-19 PROCEDURE — 90870 ELECTROCONVULSIVE THERAPY: CPT

## 2018-07-19 RX ORDER — MIDAZOLAM HYDROCHLORIDE 1 MG/ML
2 INJECTION, SOLUTION INTRAMUSCULAR; INTRAVENOUS ONCE
Qty: 0 | Refills: 0 | Status: DISCONTINUED | OUTPATIENT
Start: 2018-07-19 | End: 2018-07-19

## 2018-07-19 RX ADMIN — MIDAZOLAM HYDROCHLORIDE 2 MILLIGRAM(S): 1 INJECTION, SOLUTION INTRAMUSCULAR; INTRAVENOUS at 16:50

## 2018-07-24 PROCEDURE — 90870 ELECTROCONVULSIVE THERAPY: CPT

## 2018-07-24 RX ORDER — MIDAZOLAM HYDROCHLORIDE 1 MG/ML
2 INJECTION, SOLUTION INTRAMUSCULAR; INTRAVENOUS ONCE
Qty: 0 | Refills: 0 | Status: DISCONTINUED | OUTPATIENT
Start: 2018-07-24 | End: 2018-07-24

## 2018-07-24 RX ADMIN — MIDAZOLAM HYDROCHLORIDE 2 MILLIGRAM(S): 1 INJECTION, SOLUTION INTRAMUSCULAR; INTRAVENOUS at 15:32

## 2018-07-26 PROCEDURE — 90870 ELECTROCONVULSIVE THERAPY: CPT

## 2018-07-26 RX ORDER — MIDAZOLAM HYDROCHLORIDE 1 MG/ML
2 INJECTION, SOLUTION INTRAMUSCULAR; INTRAVENOUS ONCE
Qty: 0 | Refills: 0 | Status: DISCONTINUED | OUTPATIENT
Start: 2018-07-26 | End: 2018-07-26

## 2018-07-26 RX ADMIN — MIDAZOLAM HYDROCHLORIDE 2 MILLIGRAM(S): 1 INJECTION, SOLUTION INTRAMUSCULAR; INTRAVENOUS at 15:30

## 2018-07-31 PROCEDURE — 90870 ELECTROCONVULSIVE THERAPY: CPT

## 2018-07-31 RX ORDER — MIDAZOLAM HYDROCHLORIDE 1 MG/ML
2 INJECTION, SOLUTION INTRAMUSCULAR; INTRAVENOUS ONCE
Qty: 0 | Refills: 0 | Status: DISCONTINUED | OUTPATIENT
Start: 2018-07-31 | End: 2018-07-31

## 2018-07-31 RX ADMIN — MIDAZOLAM HYDROCHLORIDE 2 MILLIGRAM(S): 1 INJECTION, SOLUTION INTRAMUSCULAR; INTRAVENOUS at 15:41

## 2018-08-02 PROCEDURE — 90870 ELECTROCONVULSIVE THERAPY: CPT

## 2018-08-02 RX ORDER — MIDAZOLAM HYDROCHLORIDE 1 MG/ML
2 INJECTION, SOLUTION INTRAMUSCULAR; INTRAVENOUS ONCE
Qty: 0 | Refills: 0 | Status: DISCONTINUED | OUTPATIENT
Start: 2018-08-02 | End: 2018-08-02

## 2018-08-02 RX ADMIN — MIDAZOLAM HYDROCHLORIDE 2 MILLIGRAM(S): 1 INJECTION, SOLUTION INTRAMUSCULAR; INTRAVENOUS at 15:30

## 2018-08-07 PROCEDURE — 90870 ELECTROCONVULSIVE THERAPY: CPT

## 2018-08-07 RX ORDER — MIDAZOLAM HYDROCHLORIDE 1 MG/ML
2 INJECTION, SOLUTION INTRAMUSCULAR; INTRAVENOUS ONCE
Qty: 0 | Refills: 0 | Status: DISCONTINUED | OUTPATIENT
Start: 2018-08-07 | End: 2018-08-07

## 2018-08-07 RX ADMIN — MIDAZOLAM HYDROCHLORIDE 2 MILLIGRAM(S): 1 INJECTION, SOLUTION INTRAMUSCULAR; INTRAVENOUS at 16:10

## 2018-08-08 PROBLEM — F31.9 BIPOLAR DISORDER, UNSPECIFIED: Chronic | Status: ACTIVE | Noted: 2018-06-05

## 2018-08-14 PROCEDURE — 90870 ELECTROCONVULSIVE THERAPY: CPT

## 2018-08-14 RX ORDER — MIDAZOLAM HYDROCHLORIDE 1 MG/ML
2 INJECTION, SOLUTION INTRAMUSCULAR; INTRAVENOUS ONCE
Qty: 0 | Refills: 0 | Status: DISCONTINUED | OUTPATIENT
Start: 2018-08-14 | End: 2018-08-14

## 2018-08-14 RX ADMIN — MIDAZOLAM HYDROCHLORIDE 2 MILLIGRAM(S): 1 INJECTION, SOLUTION INTRAMUSCULAR; INTRAVENOUS at 16:00

## 2018-08-16 ENCOUNTER — OUTPATIENT (OUTPATIENT)
Dept: OUTPATIENT SERVICES | Facility: HOSPITAL | Age: 48
LOS: 1 days | Discharge: HOME | End: 2018-08-16
Payer: COMMERCIAL

## 2018-08-21 DIAGNOSIS — F31.9 BIPOLAR DISORDER, UNSPECIFIED: ICD-10-CM

## 2018-08-21 DIAGNOSIS — F32.9 MAJOR DEPRESSIVE DISORDER, SINGLE EPISODE, UNSPECIFIED: ICD-10-CM

## 2018-08-21 DIAGNOSIS — F12.10 CANNABIS ABUSE, UNCOMPLICATED: ICD-10-CM

## 2018-08-21 PROCEDURE — 90870 ELECTROCONVULSIVE THERAPY: CPT

## 2018-08-21 RX ORDER — MIDAZOLAM HYDROCHLORIDE 1 MG/ML
2 INJECTION, SOLUTION INTRAMUSCULAR; INTRAVENOUS ONCE
Qty: 0 | Refills: 0 | Status: DISCONTINUED | OUTPATIENT
Start: 2018-08-21 | End: 2018-08-21

## 2018-08-21 RX ADMIN — MIDAZOLAM HYDROCHLORIDE 2 MILLIGRAM(S): 1 INJECTION, SOLUTION INTRAMUSCULAR; INTRAVENOUS at 16:26

## 2018-08-30 PROCEDURE — 90870 ELECTROCONVULSIVE THERAPY: CPT

## 2018-08-30 RX ORDER — MIDAZOLAM HYDROCHLORIDE 1 MG/ML
2 INJECTION, SOLUTION INTRAMUSCULAR; INTRAVENOUS ONCE
Qty: 0 | Refills: 0 | Status: DISCONTINUED | OUTPATIENT
Start: 2018-08-30 | End: 2018-08-30

## 2018-08-30 RX ADMIN — MIDAZOLAM HYDROCHLORIDE 2 MILLIGRAM(S): 1 INJECTION, SOLUTION INTRAMUSCULAR; INTRAVENOUS at 16:08

## 2018-09-06 PROCEDURE — 90870 ELECTROCONVULSIVE THERAPY: CPT

## 2018-09-06 RX ORDER — MIDAZOLAM HYDROCHLORIDE 1 MG/ML
2 INJECTION, SOLUTION INTRAMUSCULAR; INTRAVENOUS ONCE
Qty: 0 | Refills: 0 | Status: DISCONTINUED | OUTPATIENT
Start: 2018-09-06 | End: 2018-09-06

## 2018-09-06 RX ADMIN — MIDAZOLAM HYDROCHLORIDE 2 MILLIGRAM(S): 1 INJECTION, SOLUTION INTRAMUSCULAR; INTRAVENOUS at 15:36

## 2018-09-13 PROCEDURE — 90870 ELECTROCONVULSIVE THERAPY: CPT

## 2018-09-13 RX ORDER — MIDAZOLAM HYDROCHLORIDE 1 MG/ML
2 INJECTION, SOLUTION INTRAMUSCULAR; INTRAVENOUS ONCE
Qty: 0 | Refills: 0 | Status: DISCONTINUED | OUTPATIENT
Start: 2018-09-13 | End: 2018-09-13

## 2018-09-13 RX ADMIN — MIDAZOLAM HYDROCHLORIDE 2 MILLIGRAM(S): 1 INJECTION, SOLUTION INTRAMUSCULAR; INTRAVENOUS at 16:14

## 2018-09-27 PROCEDURE — 90870 ELECTROCONVULSIVE THERAPY: CPT

## 2018-09-27 RX ORDER — MIDAZOLAM HYDROCHLORIDE 1 MG/ML
2 INJECTION, SOLUTION INTRAMUSCULAR; INTRAVENOUS ONCE
Qty: 0 | Refills: 0 | Status: DISCONTINUED | OUTPATIENT
Start: 2018-09-27 | End: 2018-09-27

## 2018-09-27 RX ADMIN — MIDAZOLAM HYDROCHLORIDE 2 MILLIGRAM(S): 1 INJECTION, SOLUTION INTRAMUSCULAR; INTRAVENOUS at 16:02

## 2018-10-10 PROCEDURE — 90870 ELECTROCONVULSIVE THERAPY: CPT

## 2018-10-10 RX ORDER — MIDAZOLAM HYDROCHLORIDE 1 MG/ML
2 INJECTION, SOLUTION INTRAMUSCULAR; INTRAVENOUS ONCE
Qty: 0 | Refills: 0 | Status: DISCONTINUED | OUTPATIENT
Start: 2018-10-10 | End: 2018-10-10

## 2018-10-10 RX ADMIN — MIDAZOLAM HYDROCHLORIDE 2 MILLIGRAM(S): 1 INJECTION, SOLUTION INTRAMUSCULAR; INTRAVENOUS at 14:45

## 2020-03-31 PROBLEM — Z00.00 ENCOUNTER FOR PREVENTIVE HEALTH EXAMINATION: Status: ACTIVE | Noted: 2020-03-31

## 2020-04-22 ENCOUNTER — APPOINTMENT (OUTPATIENT)
Dept: OTOLARYNGOLOGY | Facility: CLINIC | Age: 50
End: 2020-04-22

## 2021-02-09 PROCEDURE — 99213 OFFICE O/P EST LOW 20 MIN: CPT | Mod: 95

## 2021-04-13 PROCEDURE — 99214 OFFICE O/P EST MOD 30 MIN: CPT | Mod: 95

## 2021-08-31 PROCEDURE — 99443: CPT

## 2021-11-17 PROCEDURE — 99442: CPT | Mod: 95

## 2022-01-11 PROCEDURE — 99214 OFFICE O/P EST MOD 30 MIN: CPT | Mod: 95

## 2022-03-29 PROCEDURE — 99442: CPT | Mod: 95

## 2022-06-02 PROCEDURE — 99214 OFFICE O/P EST MOD 30 MIN: CPT | Mod: 95

## 2022-08-02 PROCEDURE — 99442: CPT | Mod: 95

## 2022-10-27 PROCEDURE — 99442: CPT | Mod: 95

## 2022-12-10 ENCOUNTER — OUTPATIENT (OUTPATIENT)
Dept: OUTPATIENT SERVICES | Facility: HOSPITAL | Age: 52
LOS: 1 days | Discharge: HOME | End: 2022-12-10
Payer: SELF-PAY

## 2023-01-20 DIAGNOSIS — F12.10 CANNABIS ABUSE, UNCOMPLICATED: ICD-10-CM

## 2023-01-20 DIAGNOSIS — F31.9 BIPOLAR DISORDER, UNSPECIFIED: ICD-10-CM

## 2023-01-20 DIAGNOSIS — F32.9 MAJOR DEPRESSIVE DISORDER, SINGLE EPISODE, UNSPECIFIED: ICD-10-CM

## 2023-05-11 PROCEDURE — 99442: CPT

## 2023-08-15 NOTE — ED BEHAVIORAL HEALTH ASSESSMENT NOTE - REFERRAL / APPOINTMENT DETAILS
Referral made to WILL[facility]

## 2023-11-20 ENCOUNTER — OUTPATIENT (OUTPATIENT)
Dept: OUTPATIENT SERVICES | Facility: HOSPITAL | Age: 53
LOS: 1 days | Discharge: STILL ADMITTED OR ADMINISTRATIVE | End: 2023-11-20
Payer: MEDICARE

## 2023-11-20 PROCEDURE — 90839 PSYTX CRISIS INITIAL 60 MIN: CPT

## 2023-11-21 DIAGNOSIS — F31.81 BIPOLAR II DISORDER: ICD-10-CM

## 2025-08-19 ENCOUNTER — TRANSCRIPTION ENCOUNTER (OUTPATIENT)
Age: 55
End: 2025-08-19